# Patient Record
Sex: FEMALE | Race: WHITE
[De-identification: names, ages, dates, MRNs, and addresses within clinical notes are randomized per-mention and may not be internally consistent; named-entity substitution may affect disease eponyms.]

---

## 2017-04-04 ENCOUNTER — HOSPITAL ENCOUNTER (OUTPATIENT)
Dept: HOSPITAL 53 - M WUC | Age: 22
End: 2017-04-04
Attending: PHYSICIAN ASSISTANT
Payer: COMMERCIAL

## 2017-04-04 DIAGNOSIS — L04.0: Primary | ICD-10-CM

## 2017-04-04 LAB
ERYTHROCYTE [DISTWIDTH] IN BLOOD BY AUTOMATED COUNT: 12.6 % (ref 11.5–14.5)
MCH RBC QN AUTO: 32.4 PG (ref 27–33)
MCHC RBC AUTO-ENTMCNC: 33.8 G/DL (ref 32–36.5)
MCV RBC AUTO: 95.7 FL (ref 80–96)
RBC MORPH BLD: NORMAL
T4 FREE SERPL-MCNC: 1.19 NG/DL (ref 0.76–1.46)
WBC # BLD AUTO: 6.6 K/MM3 (ref 4–10)

## 2020-02-21 ENCOUNTER — HOSPITAL ENCOUNTER (OUTPATIENT)
Dept: HOSPITAL 53 - M LAB REF | Age: 25
End: 2020-02-21
Attending: PHYSICIAN ASSISTANT
Payer: COMMERCIAL

## 2020-02-21 DIAGNOSIS — N39.0: Primary | ICD-10-CM

## 2020-09-03 ENCOUNTER — HOSPITAL ENCOUNTER (OUTPATIENT)
Dept: HOSPITAL 53 - M WUC | Age: 25
End: 2020-09-03
Attending: PHYSICIAN ASSISTANT
Payer: COMMERCIAL

## 2020-09-03 DIAGNOSIS — N39.0: Primary | ICD-10-CM

## 2021-01-16 ENCOUNTER — HOSPITAL ENCOUNTER (EMERGENCY)
Dept: HOSPITAL 53 - M ED | Age: 26
Discharge: HOME | End: 2021-01-16
Payer: COMMERCIAL

## 2021-01-16 VITALS — BODY MASS INDEX: 27.49 KG/M2 | WEIGHT: 171.08 LBS | HEIGHT: 66 IN

## 2021-01-16 VITALS — SYSTOLIC BLOOD PRESSURE: 146 MMHG | DIASTOLIC BLOOD PRESSURE: 94 MMHG
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
## 2021-01-16 DIAGNOSIS — S02.19XA: Primary | ICD-10-CM
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
## 2021-01-16 NOTE — CCD
Summarization Of Episode

                             Created on: 2021



PEÑA ARAUZ

External Reference #: [id number]

: 1995

Sex: Female



Demographics





                          Address                   [address]

[place]  [address]

 

                          Home Phone                [phone]

 

                          Preferred Language        English

 

                          Marital Status            Single

 

                          Nondenominational Affiliation     ME

 

                          Race                      White

 

                          Ethnic Group              Not  or 





Author





                          Author                    [facility]

 

                          Organization              [facility]

 

                          Address                   Unknown

 

                          Phone                     Unavailable







Support





                Name            Relationship    Address         Phone

 

                ST              Next Of Kin     Unknown         Unavailable

 

                    LON ARAUZ    Next Of Kin         [address]

[place], [place]  [address] [phone]

 

                    TOMMIE ARAUZ     Next Of Kin         [address]

[place], [place]  [address] [phone]

 

                    LON MOREL   Next Of Kin         [address]

[place], [place]  [address] [phone]

 

                    Lon Morel   ECON                [address] [place]  [address]                   [phone]







Care Team Providers





                    Care Team Member Name Role                Phone

 

                    RING, K LOLI PA  Unavailable         Unavailable

 

                    RING, K LOLI PA  Unavailable         Unavailable

 

                    RING, K LOLI PA  Unavailable         Unavailable

 

                    RING, K LOLI PA  Unavailable         Unavailable

 

                    RING, K LOLI PA  Unavailable         Unavailable

 

                    RING, K LOLI PA  Unavailable         Unavailable

 

                    RING, K LOLI PA  Unavailable         Unavailable

 

                    RING, K LOLI PA  Unavailable         Unavailable

 

                    RING, K LOLI PA  Unavailable         Unavailable

 

                    RING, K LOLI PA  Unavailable         Unavailable

 

                    RING, K LOLI PA  Unavailable         Unavailable

 

                    RING, K LOLI PA  Unavailable         Unavailable

 

                    RING, K LOLI PA  Unavailable         Unavailable

 

                    RING, K LOLI PA  Unavailable         Unavailable

 

                    RING, K LOLI PA  Unavailable         Unavailable

 

                    RING, K LOLI PA  Unavailable         Unavailable

 

                    RING, K LOLI PA  Unavailable         Unavailable

 

                    RING, K LOLI PA  Unavailable         Unavailable

 

                    RING, K LOLI PA  Unavailable         Unavailable

 

                    RING, K LOLI PA  Unavailable         Unavailable

 

                    RICHARD,  PRITI PA Unavailable         Unavailable

 

                    RICHARD,  PRITI PA Unavailable         Unavailable

 

                    RICHARD,  PRITI PA Unavailable         Unavailable

 

                    RICHARD,  PRITI PA Unavailable         Unavailable

 

                    RICHARD,  PRITI PA Unavailable         Unavailable

 

                    RICHARD,  PRITI PA Unavailable         Unavailable

 

                    RICHARD,  PRITI PA Unavailable         Unavailable

 

                    RICHARD,  PRITI PA Unavailable         Unavailable

 

                    RICHARD,  PRITI PA Unavailable         Unavailable

 

                    RICHARD,  PRITI PA Unavailable         Unavailable

 

                    RICHARD,  PRITI PA Unavailable         Unavailable

 

                    RICHARD,  PRITI PA Unavailable         Unavailable

 

                    RICHARD,  PRITI PA Unavailable         Unavailable

 

                    RICHARD,  PRITI PA Unavailable         Unavailable

 

                    RICHARD,  PRITI PA Unavailable         Unavailable

 

                    RICHARD,  PRITI PA Unavailable         Unavailable

 

                    RICHARD,  PRITI PA Unavailable         Unavailable

 

                    RICHARD,  PRITI PA Unavailable         Unavailable

 

                    RICHARD,  PRITI PA Unavailable         Unavailable

 

                    RICHARD,  PRITI PA Unavailable         Unavailable

 

                    RICHARD,  PRITI PA Unavailable         Unavailable

 

                    RICHARD,  PRITI PA Unavailable         Unavailable

 

                    RICHARD,  PRITI PA Unavailable         Unavailable

 

                    RICHARD,  PRITI PA Unavailable         Unavailable

 

                    RICHARD,  PRITI PA Unavailable         Unavailable

 

                    RICHARD,  PRITI PA Unavailable         Unavailable

 

                    RICHARD,  PRITI PA Unavailable         Unavailable

 

                    RICHARD,  PRITI PA Unavailable         Unavailable

 

                    RICHARD,  PRITI PA Unavailable         Unavailable

 

                    RICHARD,  PRITI PA Unavailable         Unavailable

 

                    IRCHARD,  PRITI PA Unavailable         Unavailable

 

                    RICHARD,  PRITI PA Unavailable         Unavailable

 

                    RICHARD,  PRITI PA Unavailable         Unavailable

 

                    RICHARD,  PRITI PA Unavailable         Unavailable

 

                    RICHARD,  PRITI PA Unavailable         Unavailable

 

                    RICHARD,  PRITI PA Unavailable         Unavailable

 

                    RICHARD,  PRITI PA Unavailable         Unavailable

 

                    RICHARD,  PRITI PA Unavailable         Unavailable

 

                    Macias,  Valerie NP Unavailable         Unavailable

 

                    Macias,  Valerie NP Unavailable         Unavailable

 

                    Macias,  Valerie NP Unavailable         Unavailable

 

                    Macias,  Valerie NP Unavailable         Unavailable

 

                    Macias,  Valerie NP Unavailable         Unavailable

 

                    Macias,  Valerie NP Unavailable         Unavailable

 

                    Macias,  Valerie NP Unavailable         Unavailable

 

                    Macias,  Valerie NP Unavailable         Unavailable

 

                    Macias,  Valerie NP Unavailable         Unavailable

 

                    Macias,  Valerie NP Unavailable         Unavailable

 

                    Macias,  Valerie NP Unavailable         Unavailable



                                  



Re-disclosure Warning

          The records that you are about to access may contain information from 
federally-assisted alcohol or drug abuse programs. If such information is 
present, then the following federally mandated warning applies: This information
has been disclosed to you from records protected by federal confidentiality 
rules (42 CFR part 2). The federal rules prohibit you from making any further 
disclosure of this information unless further disclosure is expressly permitted 
by the written consent of the person to whom it pertains or as otherwise 
permitted by 42 CFR part 2. A general authorization for the release of medical 
or other information is NOT sufficient for this purpose. The Federal rules 
restrict any use of the information to criminally investigate or prosecute any 
alcohol or drug abuse patient.The records that you are about to access may 
contain highly sensitive health information, the redisclosure of which is 
protected by Article 27-F of the [facility] Public Health law. If you 
continue you may have access to information: Regarding HIV / AIDS; Provided by 
facilities licensed or operated by the [facility] Office of Mental Health; 
or Provided by the [facility] Office for People With Developmental 
Disabilities. If such information is present, then the following [place] 
mandated warning applies: This information has been disclosed to you from 
confidential records which are protected by state law. State law prohibits you 
from making any further disclosure of this information without the specific 
written consent of the person to whom it pertains, or as otherwise permitted by 
law. Any unauthorized further disclosure in violation of state law may result in
a fine or skilled nursing sentence or both. A general authorization for the release of 
medical or other information is NOT sufficient authorization for further disc
losure.                                                                         
    



Family History

          



             Family Member Name Family Member Gender Family Member Status Date o

f Status 

Description                             Data Source(s)

 

           Unknown    Unknown    Problem                          [facility] ([facility]

 

           Unknown    Unknown    Problem                          [facility]

 

                                        pgf,pgm 



                                                                                
                 



Encounters

          



           Encounter  Providers  Location   Date       Indications Data Source(s

)

 

                Outpatient      Attender: PRITI Eubanks

ry 2020 

11:30:00 AM EDT                                     [facility]

 

                Outpatient      Attender: PRITI Eubanks

ry 2020 

11:05:00 AM EDT                                     [facility]

 

                Outpatient      Attender: Valerei best 2020 

11:30:00 AM EST                                     [facility]

 

                Outpatient      Attender: LOLI Ortiz 

2020 08:10:00 

AM EST                                              [facility]



                                                                                
                                     



Medications

          



          Medication Brand Name Start Date Product Form Dose      Route     Admi

nistrative 

Instructions Pharmacy Instructions Status     Indications Reaction   Description

 Data 

Source(s)

 

                                        NITROFURANTOIN, MACROCRYSTALS 25 MG / Ni

trofurantoin, Monohydrate 75 MG Oral 

Capsule [Macrobid] Macrobid 2020 12:00:00 AM EDT             ORAL         

     active              

                                        [facility] ([facility])

 

          100 mg              2020 12:00:00 AM EDT capsule   10           

       TAKE ONE CAPSULE BY MOUTH TWICE

 A DAY FOR 5 DAYS WITH FOOD             TAKE ONE CAPSULE BY MOUTH TWICE A DAY FO

R 5 DAYS 

WITH FOOD    SOLD: 2020                                        [facility]

s

 

          0.75 %              2020 12:00:00 AM EDT gel       70           

       USE 1 APPLICATORFUL INTRAVAGINALLY 

AT BEDTIME FOR 5 DAYS     USE 1 APPLICATORFUL INTRAVAGINALLY AT BEDTIME FOR 5 DA

YS 

SOLD: 2020                                                 [facility]

 

     No Active Medications      2020 12:00:00 AM EDT                      

    completed                

[facility] ([facility])

 

          20 mg               2020 12:00:00 AM EDT tablet    10           

       TAKE TWO TABLETS BY MOUTH EVERY 

DAY FOR 5 DAYS      TAKE TWO TABLETS BY MOUTH EVERY DAY FOR 5 DAYS SOLD: 

020     

                                                            [facility]

 

     No Active Medications      2020 12:00:00 AM EDT                      

    completed                

[facility] ([facility])

 

        Prednisone 20 MG Oral Tablet Prednisone 2020 12:00:00 AM EDT      

           ORAL                    

completed                                                       [facility] ([facility])

 

          6 mg/mL             2020 12:00:00 AM EST suspension for reconsti

tution 120                 TAKE 

12.5ML BY MOUTH TWO TIMES A DAY FOR 5 DAYS TAKE 12.5ML BY MOUTH TWO TIMES A DAY 

FOR 5 DAYS   SOLD: 2020                                        [facility]

s

 

          Amoxicillin 875 MG Oral Tablet Amoxicillin 2020 12:00:00 AM EST 

                    ORAL       

                      completed                                   [facility] ([facility])

 

          875 mg              2020 12:00:00 AM EST tablet    20           

       TAKE ONE TABLET BY MOUTH TWICE A

 DAY AS DIRECTED FOR 10 DAYS            TAKE ONE TABLET BY MOUTH TWICE A DAY AS 

DIRECTED 

FOR 10 DAYS  SOLD: 2020                                        [facility]

s

 

     No Active Medications      2020 12:00:00 AM EST                      

    completed                

[facility] ([facility])

 

          100 mg              2020 12:00:00 AM EST capsule   10           

       TAKE ONE CAPSULE BY MOUTH EVERY

 12 HOURS FOR 5 DAYS      TAKE ONE CAPSULE BY MOUTH EVERY 12 HOURS FOR 5 DAYS SO

LD: 

2020                                                      [facility]

 

                                        NITROFURANTOIN, MACROCRYSTALS 25 MG / Ni

trofurantoin, Monohydrate 75 MG Oral 

Capsule [Macrobid] Macrobid 2020 12:00:00 AM EST               ORAL       

          completed        

                                                    [facility] ([facility]

[facility], [facility])

 

     No Active Medications      2020 12:00:00 AM EST                      

    completed                

[facility] ([facility])

 

        Amoxicillin 875 MG Oral Tablet Amoxicillin 2019 12:00:00 AM EST   

                                      

completed                                                       [facility] ([facility])



                                                                                
                                                                                
                                                



Insurance Providers

          



             Payer name   Policy type / Coverage type Policy ID    Covered party

 ID Covered 

party's relationship to lima Policy Lima             Plan Information

 

          [facility]           [id number]                             [id number]

[id number]

 

          [facility]           [id number]            SP                 

 [id number]

 

          [facility]           [id number]           SP                  [id number]

[id number]

 

          [facility]           [id number]           SP                  [id number]

[id number]

 

          [facility] [employer] [id number]           Self          

      [id number]

 

          [facility] [employer] [id number]           Self          

      [id number]

 

                    [employer] [id number]

[id number]                               

[id number]

 

          [facility]       Commercial [id number]           Self                [id number]

[id number]

 

          MVP       Commercial                     Self                 

 

          HMO BLUE            [id number]           SP                  [id number]

[id number]

 

          BLUE CROSS BLUE SHIELD-O/P           [id number]           [id number]        

          [id number]

 

          BLUE CROSS SALDIVAR PLAN           [id number]           SP            

      [id number]

 

          HMO BLUE            [id number]           SP                  [id number]

[id number]

 

          BLUE CROSS BLUE SHIELD-CLINIC           [id number]           [id number]     

             [id number]

 

          BLUE CROSS BLUE SHIELD-O/P           [id number]           [id number]        

          [id number]

 

          BLUE CROSS BLUE SHIELD-O/P           [id number]           [id number]        

          [id number]

 

                              [id number]                               [id number]

[id number]



                                                                                
                                                                                
                                                                                
        



Results

          



                    ID                  Date                Data Source

 

                    [id number]             2020 11:53:00 AM EDT [facility] ([facility])









          Name      Value     Range     Interpretation Code Description Data Arlyn

rce(s) Supporting 

Document(s)

 

           Bacteria identified in Urine by Culture Laboratory test result       

                           [facility] 

([facility])            

 

                                        rx Macrobid 









                    ID                  Date                Data Source

 

                    [id number]             2020 09:43:00 AM EST [facility] ([facility])









          Name      Value     Range     Interpretation Code Description Data Arlyn

rce(s) Supporting 

Document(s)

 

           Bacteria identified in Urine by Culture Laboratory test result       

                           [facility] 

([facility])            

 

                                        <content>FULL REPORT IN LAB NOTES ([facility]

nd 

[facility]).</content><br/><content></content><br/><content>ORGANISM 1: ESCHERICHIA 
COLI</content><br/><content></content><br/><content>COLONY COUNT                
  
20,000</content><br/><content></content><br/><content></content><br/><content>OR

GANISM 1: ESCHERICHIA COLI</content><br/><content></content><br/><content>
ESCHERICHIA COLI:                                  
REACTION</content><br/><content>EXTD BRD SPCTRM BETA LACTAMASE     IV         
NEGATIVE FOR ESBL</content><br/><content>TRIMETHOPRIM/SULFAMETHOXAZOLE      IV  
       160mg TMP & 800mg SMXq6h <=20      
S</content><br/><content>TRIMETHOPRIM/SULFAMETHOXAZOLE      PO         Bactrim 
DS Bid <=20      S</content><br/><content>AMPICILLIN                         IV 
        500mg q6h <=2      S</content><br/><content>AMPICILLIN                  
       PO         500mg q6h fasting <=2      S</content><br/><content>GENTAMICIN
                         IV         80mg  q8h <=1      
S</content><br/><content>NITROFURANTOIN                     PO         100mg BID
 <=16      S</content><br/><content>CEFAZOLIN                          IV       
  1gm q8h <=4      S</content><br/><content>LEVOFLOXACIN                       
IV         500mg qd <=0.12      S</content><br/><content>LEVOFLOXACIN           
            PO         250mg qd <=0.12      
S</content><br/><content>LEVOFLOXACIN                       PO         500mg qd 
<=0.12      S</content><br/><content>TOBRAMYCIN                         IV      
   80mg  q8h <=1      S</content><br/><content>CEFTRIAXONE                      
  IV         1gm q24h <=1      S</content><br/><content>CEFTAZIDIME             
           IV         1gm  q8h <=1      
S</content><br/><content>AMPICILLIN/SULBACTAM               IV         1.5g q6h 
<=2      S</content><br/><content>PIPERACILLIN/TAZOBACTAM            IV         
2.25 gm q6h <=4      S</content><br/><content>AZTREONAM                         
 IV         1gm  q8h <=1      S</content><br/><content>ERTAPENEM                
          IV         1gm qd <=0.5      S</content><br/><content>MEROPENEM       
                   IV         1 gm q8h <=0.25      S</content><br/><content>
MEROPENEM                          IV         500 mg q8h <=0.25      
S</content><br/><content>TIGECYCLINE                        IV         50mg q12h
 <=0.5      S</content><br/><content>CEFEPIME                           IV      
   1 gm q12h <=1      S</content><br/><content>CEFEPIME                         
  IV         2 gm q12h <=1      S</content><br/><content></content> 







                                        Procedure

 

                                          



                                                                                
                                      



Vital Signs

          



                    ID                  Date                Data Source

 

                    UNK                                      









           Name       Value      Range      Interpretation Code Description Data

 Source(s)

 

           Body mass index (BMI) [Ratio] 29.0 kg/m2                       29.0 k

g/m2 [facility] ([facility])

 

           Body height 67 [in_i]                        67 [in_i]  [facility] ([facility])

 

                                        5'7" 

 

           Body weight 185.00 [lb_av]                       185.00 [lb_av] [facility]

T ([facility])

 

           Body temperature 97.7 [degF]                       97.7 [degF] [facility]

 ([facility])

 

           Oxygen saturation in Arterial blood by Pulse oximetry 98 %           

                  98 %       [facility] 

([facility])

 

           Respiratory rate 14 /min                          14 /min    [facility] (

[facility], [facility])

 

           Heart rate 96 /min                          96 /min    [facility] ([facility]

[facility])

 

           Diastolic blood pressure 78 mm[Hg]                        78 mm[Hg]  

[facility] ([facility] 

[facility])

 

           Systolic blood pressure 126 mm[Hg]                       126 mm[Hg] M

[facility] ([facility],

 [facility])

 

           Diastolic blood pressure 83 mm[Hg]                        83 mm[Hg]  

[facility] ([facility] 

[facility])

 

           Systolic blood pressure 130 mm[Hg]                       130 mm[Hg] M

[facility] ([facility],

 [facility])

 

           Body mass index (BMI) [Ratio] 27.4 kg/m2                       27.4 k

g/m2 [facility] ([facility])

 

           Body height 67 [in_i]                        67 [in_i]  [facility] ([facility])

 

                                        5'7" 

 

           Body weight 175.00 [lb_av]                       175.00 [lb_av] [facility]

T ([facility], 

[facility])

 

           Body temperature 98.5 [degF]                       98.5 [degF] [facility]

 ([facility], 

[facility])

 

           Oxygen saturation in Arterial blood by Pulse oximetry 98 %           

                  98 %       [facility] 

([facility])

 

           Respiratory rate 16 /min                          16 /min    [facility] (

[facility])

 

           Heart rate 85 /min                          85 /min    [facility]

[facility]

 

           Body mass index (BMI) [Ratio] 26.6 kg/m2                       26.6 k

g/m2 [facility] ([facility])

 

           Body height 67 [in_i]                        67 [in_i]  [facility] ([facility])

 

                                        5'7" 

 

           Body weight 170.00 [lb_av]                       170.00 [lb_av] [facility]

T ([facility], 

[facility])

 

           Body temperature 98.3 [degF]                       98.3 [degF] [facility]

 ([facility] 

[facility])

 

           Oxygen saturation in Arterial blood by Pulse oximetry 95 %           

                  95 %       [facility] 

([facility] [facility])

 

           Respiratory rate 18 /min                          18 /min    [facility] (

[facility], [facility])

 

           Heart rate 88 /min                          88 /min    [facility] ([facility], [facility])

 

           Diastolic blood pressure 82 mm[Hg]                        82 mm[Hg]  

[facility] ([facility], 

[facility])

 

           Systolic blood pressure 138 mm[Hg]                       138 mm[Hg] [facility] ([facility],

 [facility])

 

           Body mass index (BMI) [Ratio] 26.6 kg/m2                       26.6 k

g/m2 [facility] ([facility]

 [facility], [facility])

 

           Body height 67 [in_i]                        67 [in_i]  [facility] ([facility], [facility])

 

                                        5'7" 

 

           Body weight 170.00 [lb_av]                       170.00 [lb_av] [facility]

[facility] ([facility], 

[facility])

 

           Body temperature 98.0 [degF]                       98.0 [degF] [facility]

 ([facility], 

[facility])

 

           Oxygen saturation in Arterial blood by Pulse oximetry 98 %           

                  98 %       [facility] 

([facility], [facility])

 

           Respiratory rate 17 /min                          17 /min    [facility] (

[facility], [facility])

 

           Heart rate 97 /min                          97 /min    [facility] ([facility], [facility])

 

           Diastolic blood pressure 83 mm[Hg]                        83 mm[Hg]  

[facility] ([facility], 

[facility])

 

           Systolic blood pressure 125 mm[Hg]                       125 mm[Hg] [facility] ([facility],

 [facility])

## 2021-01-16 NOTE — CCD
Summarization Of Episode

                             Created on: 2021



PEÑA ARAUZ

External Reference #: [id number]

: 1995

Sex: Female



Demographics





                          Address                   [address]

[place]  [address]

 

                          Home Phone                [phone]

 

                          Preferred Language        English

 

                          Marital Status            

 

                          Congregational Affiliation     ME

 

                          Race                      White

 

                          Ethnic Group              Not  or 





Author





                          Author                    [facility]

 

                          Organization              [facility]

 

                          Address                   Unknown

 

                          Phone                     Unavailable







Support





                Name            Relationship    Address         Phone

 

                    RADHA ROCK  Next Of Kin         [address]

[place], [place]  [address] [phone]

 

                ST              Next Of Kin     Unknown         Unavailable

 

                    LON ARAUZ    Next Of Kin         [address]

[place]  [address]                  [phone]

 

                    TOMMIE ARAUZ     Next Of Kin         [address]

[place]  [address]                  [phone]

 

                    LON MOREL   Next Of Kin         [address]

[place]  [address]                  [phone]

 

                    Lon Morel   ECON                [address] [place]  [address]                   [phone]







Care Team Providers





                    Care Team Member Name Role                Phone

 

                    RING, K LOLI PA  Unavailable         Unavailable

 

                    RING, K LOLI PA  Unavailable         Unavailable

 

                    RING, K LOLI PA  Unavailable         Unavailable

 

                    RING, K LOLI PA  Unavailable         Unavailable

 

                    RING, K LOLI PA  Unavailable         Unavailable

 

                    RING, K LOLI PA  Unavailable         Unavailable

 

                    RING, K LOLI PA  Unavailable         Unavailable

 

                    RING, K LOLI PA  Unavailable         Unavailable

 

                    RING, K LOLI PA  Unavailable         Unavailable

 

                    RING, K LOLI PA  Unavailable         Unavailable

 

                    RING, K LOLI PA  Unavailable         Unavailable

 

                    RING, K LOLI PA  Unavailable         Unavailable

 

                    RING, K LOLI PA  Unavailable         Unavailable

 

                    RING, K LOLI PA  Unavailable         Unavailable

 

                    RING, K LOLI PA  Unavailable         Unavailable

 

                    RING, K LOLI PA  Unavailable         Unavailable

 

                    RING, K LOLI PA  Unavailable         Unavailable

 

                    RING, K LOLI PA  Unavailable         Unavailable

 

                    RING, K LOLI PA  Unavailable         Unavailable

 

                    RING, K LOLI PA  Unavailable         Unavailable

 

                    RICHARD,  PRITI PA Unavailable         Unavailable

 

                    RICHARD,  PRITI PA Unavailable         Unavailable

 

                    RICHARD,  PRITI PA Unavailable         Unavailable

 

                    RICHARD,  PRITI PA Unavailable         Unavailable

 

                    RICHARD,  PRITI PA Unavailable         Unavailable

 

                    RICHARD,  PRITI PA Unavailable         Unavailable

 

                    RICHARD,  PRITI PA Unavailable         Unavailable

 

                    RICHARD,  PRITI PA Unavailable         Unavailable

 

                    RICHARD,  PRITI PA Unavailable         Unavailable

 

                    RICHARD,  PRITI PA Unavailable         Unavailable

 

                    RICHARD,  PRITI PA Unavailable         Unavailable

 

                    RICHARD,  PRITI PA Unavailable         Unavailable

 

                    RICHARD,  PRITI PA Unavailable         Unavailable

 

                    RICHARD,  PRITI PA Unavailable         Unavailable

 

                    RICHARD,  PRITI PA Unavailable         Unavailable

 

                    RICHARD,  PRITI PA Unavailable         Unavailable

 

                    RICHARD,  PRITI PA Unavailable         Unavailable

 

                    RICHARD,  PRITI PA Unavailable         Unavailable

 

                    RICHARD,  PRITI PA Unavailable         Unavailable

 

                    RICHARD,  PRITI PA Unavailable         Unavailable

 

                    RICHARD,  PRITI PA Unavailable         Unavailable

 

                    RICHARD,  RPITI PA Unavailable         Unavailable

 

                    RICHARD,  PRITI PA Unavailable         Unavailable

 

                    RICHARD,  PRITI PA Unavailable         Unavailable

 

                    RICHARD,  PRITI PA Unavailable         Unavailable

 

                    RICHARD,  PRITI PA Unavailable         Unavailable

 

                    RICHARD,  PRITI PA Unavailable         Unavailable

 

                    RICHARD,  PRITI PA Unavailable         Unavailable

 

                    RICHARD,  PRITI PA Unavailable         Unavailable

 

                    RICHARD,  PRITI PA Unavailable         Unavailable

 

                    RICHARD,  PRITI PA Unavailable         Unavailable

 

                    RICHARD,  PRITI PA Unavailable         Unavailable

 

                    RICHARD,  PRITI PA Unavailable         Unavailable

 

                    RICHARD,  PRITI PA Unavailable         Unavailable

 

                    RICHARD,  PRITI PA Unavailable         Unavailable

 

                    RICHARD,  PRITI PA Unavailable         Unavailable

 

                    RICHARD,  PRITI PA Unavailable         Unavailable

 

                    RICHARD,  PRITI PA Unavailable         Unavailable

 

                    Macias,  Valerie NP Unavailable         Unavailable

 

                    Macias,  Valerie NP Unavailable         Unavailable

 

                    Macias,  Valerie NP Unavailable         Unavailable

 

                    Macias,  Valerie NP Unavailable         Unavailable

 

                    Macias,  Valerie NP Unavailable         Unavailable

 

                    Macias,  Valerie NP Unavailable         Unavailable

 

                    Macias,  Valerie NP Unavailable         Unavailable

 

                    Macias,  Valerie NP Unavailable         Unavailable

 

                    Macias,  Valerie NP Unavailable         Unavailable

 

                    Macias,  Valerie NP Unavailable         Unavailable

 

                    Macias,  Valerie NP Unavailable         Unavailable



                                  



Re-disclosure Warning

          The records that you are about to access may contain information from 
federally-assisted alcohol or drug abuse programs. If such information is 
present, then the following federally mandated warning applies: This information
has been disclosed to you from records protected by federal confidentiality 
rules (42 CFR part 2). The federal rules prohibit you from making any further 
disclosure of this information unless further disclosure is expressly permitted 
by the written consent of the person to whom it pertains or as otherwise 
permitted by 42 CFR part 2. A general authorization for the release of medical 
or other information is NOT sufficient for this purpose. The Federal rules 
restrict any use of the information to criminally investigate or prosecute any 
alcohol or drug abuse patient.The records that you are about to access may 
contain highly sensitive health information, the redisclosure of which is 
protected by Article 27-F of the [facility] Public Health law. If you 
continue you may have access to information: Regarding HIV / AIDS; Provided by 
facilities licensed or operated by the [facility] Office of Mental Health; 
or Provided by the [facility] Office for People With Developmental 
Disabilities. If such information is present, then the following [place] 
mandated warning applies: This information has been disclosed to you from 
confidential records which are protected by state law. State law prohibits you 
from making any further disclosure of this information without the specific 
written consent of the person to whom it pertains, or as otherwise permitted by 
law. Any unauthorized further disclosure in violation of state law may result in
a fine or [facility] sentence or both. A general authorization for the release of 
medical or other information is NOT sufficient authorization for further disc
losure.                                                                         
    



Family History

          



             Family Member Name Family Member Gender Family Member Status Date o

f Status 

Description                             Data Source(s)

 

           Unknown    Unknown    Problem                          [facility] ([facility]

 

           Unknown    Unknown    Problem                          [facility] ([facility]

 

                                        pgf,pgm 



                                                                                
                 



Encounters

          



           Encounter  Providers  Location   Date       Indications Data Source(s

)

 

                Outpatient      Attender: PRITI Eubanks

ry 2020 

11:30:00 AM EDT                                     [facility] ([facility]

 

                Outpatient      Attender: PRITI Eubanks

ry 2020 

11:05:00 AM EDT                                     [facility]

 

                Outpatient      Attender: Vaelrie best 2020 

11:30:00 AM EST                                     [facility]

 

                Outpatient      Attender: LOLI Barbosa Primary 

2020 08:10:00 

AM EST                                              [facility]



                                                                                
                                     



Medications

          



          Medication Brand Name Start Date Product Form Dose      Route     Admi

nistrative 

Instructions Pharmacy Instructions Status     Indications Reaction   Description

 Data 

Source(s)

 

                                        NITROFURANTOIN, MACROCRYSTALS 25 MG / Ni

trofurantoin, Monohydrate 75 MG Oral 

Capsule [Macrobid] Macrobid 2020 12:00:00 AM EDT             ORAL         

     active              

                                        [facility] ([facility])

 

          100 mg              2020 12:00:00 AM EDT capsule   10           

       TAKE ONE CAPSULE BY MOUTH TWICE

 A DAY FOR 5 DAYS WITH FOOD             TAKE ONE CAPSULE BY MOUTH TWICE A DAY FO

R 5 DAYS 

WITH FOOD    SOLD: 2020                                        [facility]

s

 

          0.75 %              2020 12:00:00 AM EDT gel       70           

       USE 1 APPLICATORFUL INTRAVAGINALLY 

AT BEDTIME FOR 5 DAYS     USE 1 APPLICATORFUL INTRAVAGINALLY AT BEDTIME FOR 5 DA

YS 

SOLD: 2020                                                 [facility]

 

     No Active Medications      2020 12:00:00 AM EDT                      

    completed                

[facility] ([facility])

 

          20 mg               2020 12:00:00 AM EDT tablet    10           

       TAKE TWO TABLETS BY MOUTH EVERY 

DAY FOR 5 DAYS      TAKE TWO TABLETS BY MOUTH EVERY DAY FOR 5 DAYS SOLD: 

020     

                                                            [facility]

 

     No Active Medications      2020 12:00:00 AM EDT                      

    completed                

[facility] ([facility])

 

        Prednisone 20 MG Oral Tablet Prednisone 2020 12:00:00 AM EDT      

           ORAL                    

completed                                                       [facility] ([facility])

 

          6 mg/mL             2020 12:00:00 AM EST suspension for reconsti

tution 120                 TAKE 

12.5ML BY MOUTH TWO TIMES A DAY FOR 5 DAYS TAKE 12.5ML BY MOUTH TWO TIMES A DAY 

FOR 5 DAYS   SOLD: 2020                                        [facility]

s

 

          Amoxicillin 875 MG Oral Tablet Amoxicillin 2020 12:00:00 AM EST 

                    ORAL       

                      completed                                   [facility] ([facility])

 

          875 mg              2020 12:00:00 AM EST tablet    20           

       TAKE ONE TABLET BY MOUTH TWICE A

 DAY AS DIRECTED FOR 10 DAYS            TAKE ONE TABLET BY MOUTH TWICE A DAY AS 

DIRECTED 

FOR 10 DAYS  SOLD: 2020                                        [facility]

s

 

     No Active Medications      2020 12:00:00 AM EST                      

    completed                

[facility] ([facility])

 

          100 mg              2020 12:00:00 AM EST capsule   10           

       TAKE ONE CAPSULE BY MOUTH EVERY

 12 HOURS FOR 5 DAYS      TAKE ONE CAPSULE BY MOUTH EVERY 12 HOURS FOR 5 DAYS SO

LD: 

2020                                                      [facility]

 

                                        NITROFURANTOIN, MACROCRYSTALS 25 MG / Ni

trofurantoin, Monohydrate 75 MG Oral 

Capsule [Macrobid] Macrobid 2020 12:00:00 AM EST               ORAL       

          completed        

                                                    [facility] ([facility]

[facility], [facility])

 

     No Active Medications      2020 12:00:00 AM EST                      

    completed                

[facility] ([facility])

 

        Amoxicillin 875 MG Oral Tablet Amoxicillin 2019 12:00:00 AM EST   

                                      

completed                                                       [facility] ([facility])



                                                                                
                                                                                
                                                



Insurance Providers

          



             Payer name   Policy type / Coverage type Policy ID    Covered party

 ID Covered 

party's relationship to lima Policy Lima             Plan Information

 

          [employer] [facility] DIV           [id number]           SP            

      [id number]

 

          [employer]           [id number]           SP                  [id number]

[id number]

 

          [facility] INSURANCE FUND           [id number]            SP                 

 [id number]

 

          MVP MCDO           [id number]           SP                  [id number]

[id number]

 

          MVP HEALTH CARE           [id number]           SP                  [id number]

[id number]

 

          [facility] Commercial [id number]           Self          

      [id number]

 

          [facility] Commercial [id number]           Self          

      [id number]

 

                    [employer] Insurance [id number]

[id number]                               

[id number]

 

          MV       Commercial [id number]           Self                [id number]

[id number]

 

          MVP       Commercial                     Self                 

 

          HMO BLUE            [id number]           SP                  [id number]

[id number]

 

          BLUE CROSS BLUE SHIELD-O/P           [id number]           [id number]        

          [id number]

 

          BLUE CROSS SALDIVAR PLAN           [id number]           SP            

      [id number]

 

          HMO BLUE            [id number]           SP                  [id number]

[id number]

 

          BLUE CROSS BLUE SHIELD-CLINIC           [id number]           [id number]     

             [id number]

 

          BLUE CROSS BLUE SHIELD-O/P           [id number]           [id number]        

          [id number]

 

          BLUE CROSS BLUE SHIELD-O/P           [id number]           [id number]        

          [id number]

 

                              [id number]                               [id number]

[id number]



                                                                                
                                                                                
                                                                                
                  



Results

          



                    ID                  Date                Data Source

 

                    [id number]             2020 11:53:00 AM EDT [facility] ([facility])









          Name      Value     Range     Interpretation Code Description Data Arlyn

rce(s) Supporting 

Document(s)

 

           Bacteria identified in Urine by Culture Laboratory test result       

                           [facility] 

([facility], [facility])            

 

                                        rx Macrobid 









                    ID                  Date                Data Source

 

                    [id number]             2020 09:43:00 AM EST [facility] ([facility])









          Name      Value     Range     Interpretation Code Description Data Arlyn

rce(s) Supporting 

Document(s)

 

           Bacteria identified in Urine by Culture Laboratory test result       

                           [facility] 

([facility])            

 

                                        <content>FULL REPORT IN LAB NOTES (e[facility] a

nd 

[facility]).</content><br/><content></content><br/><content>ORGANISM 1: ESCHERICHIA 
COLI</content><br/><content></content><br/><content>COLONY COUNT                
  
20,000</content><br/><content></content><br/><content></content><br/><content>OR

GANISM 1: ESCHERICHIA COLI</content><br/><content></content><br/><content>
ESCHERICHIA COLI:                                  
REACTION</content><br/><content>EXTD BRD SPCTRM BETA LACTAMASE     IV         
NEGATIVE FOR ESBL</content><br/><content>TRIMETHOPRIM/SULFAMETHOXAZOLE      IV  
       160mg TMP & 800mg SMXq6h <=20      
S</content><br/><content>TRIMETHOPRIM/SULFAMETHOXAZOLE      PO         Bactrim 
DS Bid <=20      S</content><br/><content>AMPICILLIN                         IV 
        500mg q6h <=2      S</content><br/><content>AMPICILLIN                  
       PO         500mg q6h fasting <=2      S</content><br/><content>GENTAMICIN
                         IV         80mg  q8h <=1      
S</content><br/><content>NITROFURANTOIN                     PO         100mg BID
 <=16      S</content><br/><content>CEFAZOLIN                          IV       
  1gm q8h <=4      S</content><br/><content>LEVOFLOXACIN                       
IV         500mg qd <=0.12      S</content><br/><content>LEVOFLOXACIN           
            PO         250mg qd <=0.12      
S</content><br/><content>LEVOFLOXACIN                       PO         500mg qd 
<=0.12      S</content><br/><content>TOBRAMYCIN                         IV      
   80mg  q8h <=1      S</content><br/><content>CEFTRIAXONE                      
  IV         1gm q24h <=1      S</content><br/><content>CEFTAZIDIME             
           IV         1gm  q8h <=1      
S</content><br/><content>AMPICILLIN/SULBACTAM               IV         1.5g q6h 
<=2      S</content><br/><content>PIPERACILLIN/TAZOBACTAM            IV         
2.25 gm q6h <=4      S</content><br/><content>AZTREONAM                         
 IV         1gm  q8h <=1      S</content><br/><content>ERTAPENEM                
          IV         1gm qd <=0.5      S</content><br/><content>MEROPENEM       
                   IV         1 gm q8h <=0.25      S</content><br/><content>
MEROPENEM                          IV         500 mg q8h <=0.25      
S</content><br/><content>TIGECYCLINE                        IV         50mg q12h
 <=0.5      S</content><br/><content>CEFEPIME                           IV      
   1 gm q12h <=1      S</content><br/><content>CEFEPIME                         
  IV         2 gm q12h <=1      S</content><br/><content></content> 







                                        Procedure

 

                                          



                                                                                
                                      



Vital Signs

          



                    ID                  Date                Data Source

 

                    UNK                                      









           Name       Value      Range      Interpretation Code Description Data

 Source(s)

 

           Body mass index (BMI) [Ratio] 29.0 kg/m2                       29.0 k

g/m2 [facility]

 

           Body height 67 [in_i]                        67 [in_i]  [facility]

 

                                        5'7" 

 

           Body weight 185.00 [lb_av]                       185.00 [lb_av] [facility]

T ([facility])

 

           Body temperature 97.7 [degF]                       97.7 [degF] [facility]

 ([facility], 

[facility])

 

           Oxygen saturation in Arterial blood by Pulse oximetry 98 %           

                  98 %       [facility] 

([facility], [facility])

 

           Respiratory rate 14 /min                          14 /min    [facility] (

[facility], [facility])

 

           Heart rate 96 /min                          96 /min    [facility] ([facility]

[facility])

 

           Diastolic blood pressure 78 mm[Hg]                        78 mm[Hg]  

[facility] ([facility], 

[facility])

 

           Systolic blood pressure 126 mm[Hg]                       126 mm[Hg] M

[facility] ([facility],

 [facility])

 

           Diastolic blood pressure 83 mm[Hg]                        83 mm[Hg]  

[facility] ([facility], 

[facility])

 

           Systolic blood pressure 130 mm[Hg]                       130 mm[Hg] M

[facility] ([facility],

 [facility])

 

           Body mass index (BMI) [Ratio] 27.4 kg/m2                       27.4 k

g/m2 [facility] ([facility])

 

           Body height 67 [in_i]                        67 [in_i]  [facility] ([facility])

 

                                        5'7" 

 

           Body weight 175.00 [lb_av]                       175.00 [lb_av] [facility]

T ([facility], 

[facility])

 

           Body temperature 98.5 [degF]                       98.5 [degF] [facility]

 ([facility] 

[facility])

 

           Oxygen saturation in Arterial blood by Pulse oximetry 98 %           

                  98 %       [facility] 

([facility], [facility])

 

           Respiratory rate 16 /min                          16 /min    [facility] (

[facility], [facility])

 

           Heart rate 85 /min                          85 /min    [facility] ([facility])

 

           Body mass index (BMI) [Ratio] 26.6 kg/m2                       26.6 k

g/m2 [facility] ([facility], [facility])

 

           Body height 67 [in_i]                        67 [in_i]  [facility] ([facility], [facility])

 

                                        5'7" 

 

           Body weight 170.00 [lb_av]                       170.00 [lb_av] MEDEN

T ([facility], 

[facility])

 

           Body temperature 98.3 [degF]                       98.3 [degF] [facility]

 ([facility], 

[facility])

 

           Oxygen saturation in Arterial blood by Pulse oximetry 95 %           

                  95 %       [facility] 

([facility], [facility])

 

           Respiratory rate 18 /min                          18 /min    [facility] (

[facility], [facility])

 

           Heart rate 88 /min                          88 /min    [facility], [facility])

 

           Diastolic blood pressure 82 mm[Hg]                        82 mm[Hg]  

[facility] ([facility], 

[facility])

 

           Systolic blood pressure 138 mm[Hg]                       138 mm[Hg] M

[facility] ([facility],

 [facility])

 

           Body mass index (BMI) [Ratio] 26.6 kg/m2                       26.6 k

g/m2 [facility] ([facility], [facility])

 

           Body height 67 [in_i]                        67 [in_i]  [facility] ([facility], [facility])

 

                                        5'7" 

 

           Body weight 170.00 [lb_av]                       170.00 [lb_av] [facility]

T ([facility], 

[facility])

 

           Body temperature 98.0 [degF]                       98.0 [degF] [facility]

 ([facility], 

[facility])

 

           Oxygen saturation in Arterial blood by Pulse oximetry 98 %           

                  98 %       [facility] 

([facility], [facility])

 

           Respiratory rate 17 /min                          17 /min    [facility] (

[facility], [facility])

 

           Heart rate 97 /min                          97 /min    [facility], [facility])

 

           Diastolic blood pressure 83 mm[Hg]                        83 mm[Hg]  

[facility] ([facility], 

[facility])

 

           Systolic blood pressure 125 mm[Hg]                       125 mm[Hg] 

[facility] ([facility],

 [facility])
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
## 2021-12-08 ENCOUNTER — HOSPITAL ENCOUNTER (OUTPATIENT)
Dept: HOSPITAL 53 - M WUC | Age: 26
End: 2021-12-08
Attending: PHYSICIAN ASSISTANT
Payer: COMMERCIAL

## 2021-12-08 DIAGNOSIS — N39.0: Primary | ICD-10-CM
